# Patient Record
Sex: FEMALE | Race: ASIAN | HISPANIC OR LATINO | URBAN - METROPOLITAN AREA
[De-identification: names, ages, dates, MRNs, and addresses within clinical notes are randomized per-mention and may not be internally consistent; named-entity substitution may affect disease eponyms.]

---

## 2023-10-03 ENCOUNTER — OFFICE VISIT (OUTPATIENT)
Dept: OBGYN CLINIC | Facility: OTHER | Age: 53
End: 2023-10-03
Payer: COMMERCIAL

## 2023-10-03 VITALS
WEIGHT: 140.3 LBS | SYSTOLIC BLOOD PRESSURE: 118 MMHG | HEART RATE: 73 BPM | HEIGHT: 62 IN | BODY MASS INDEX: 25.82 KG/M2 | DIASTOLIC BLOOD PRESSURE: 79 MMHG

## 2023-10-03 DIAGNOSIS — G89.29 CHRONIC PAIN OF RIGHT KNEE: ICD-10-CM

## 2023-10-03 DIAGNOSIS — M17.11 PRIMARY OSTEOARTHRITIS OF RIGHT KNEE: Primary | ICD-10-CM

## 2023-10-03 DIAGNOSIS — M25.561 CHRONIC PAIN OF RIGHT KNEE: ICD-10-CM

## 2023-10-03 PROCEDURE — 99203 OFFICE O/P NEW LOW 30 MIN: CPT | Performed by: ORTHOPAEDIC SURGERY

## 2023-10-03 RX ORDER — LIDOCAINE 50 MG/G
OINTMENT TOPICAL 3 TIMES DAILY PRN
Qty: 30 G | Refills: 1 | Status: SHIPPED | OUTPATIENT
Start: 2023-10-03

## 2023-10-03 RX ORDER — PYRIDOXINE HCL (VITAMIN B6) 100 MG
100 TABLET ORAL DAILY
COMMUNITY
Start: 2023-05-01 | End: 2024-04-30

## 2023-10-03 RX ORDER — TRETINOIN 1 MG/G
CREAM TOPICAL
COMMUNITY
Start: 2023-05-01 | End: 2024-04-30

## 2023-10-03 RX ORDER — MIRTAZAPINE 15 MG/1
TABLET, FILM COATED ORAL
COMMUNITY
Start: 2023-09-27

## 2023-10-03 RX ORDER — SPIRONOLACTONE 50 MG/1
TABLET, FILM COATED ORAL
COMMUNITY
Start: 2023-09-12

## 2023-10-03 RX ORDER — MELOXICAM 7.5 MG/1
7.5 TABLET ORAL DAILY
COMMUNITY
Start: 2023-09-15

## 2023-10-03 NOTE — PATIENT INSTRUCTIONS
Knee Exercises   AMBULATORY CARE:   What you need to know about knee exercises:  Knee exercises help strengthen the muscles around your knee. Strong muscles can help reduce pain and decrease your risk of future injury. Knee exercises also help you heal after an injury or surgery. These are beginning exercises. Ask your healthcare provider if you need to see a physical therapist for more advanced exercises. General guidelines for knee exercises:   Start slowly. As you get stronger, you may be able to do more sets of each exercise or add weights. Stop if you feel pain. It is normal to feel some discomfort at first, but you should not feel pain. Tell your provider or physical therapist if you have pain while you exercise. Regular exercise will help decrease your discomfort over time. Do the exercises on both legs. Do this so both knees remain strong. Warm up before you do knee exercises. Walk or ride a stationary bike for 5 or 10 minutes to warm your muscles. How to perform knee stretches safely:  Always stretch before you do strengthening exercises. Do these stretching exercises again after you do the strengthening exercises. Do these stretches 4 or 5 days a week, or as directed. Standing calf stretch: Face a wall and place both palms flat on the wall, or hold the back of a chair for balance. Keep a slight bend in your knees. Take a big step backward with one leg. Keep your other leg directly under you. Keep both heels flat and press your hips forward. Hold the stretch for 30 seconds, and then relax for 30 seconds. Switch legs. Repeat 2 or 3 times on each leg. Standing quadriceps stretch:  Stand and place one hand against a wall or hold the back of a chair for balance. With your weight on one leg, bend your other leg and grab your ankle. Bring your heel toward your buttocks. Hold the stretch for 30 to 60 seconds. Switch legs. Repeat 2 or 3 times on each leg.          Sitting hamstring stretch:  Sit with both legs straight in front of you. Do not point or flex your toes. Place your palms on the floor and slide your hands forward until you feel the stretch. Do not round your back. Hold the stretch for 30 seconds. Repeat 2 or 3 times. How to perform knee strengthening exercises safely:  Do these exercises 4 or 5 days a week, or as directed. Standing half squats:  Stand with your feet shoulder-width apart. Lean your back against a wall or hold the back of a chair for balance, if needed. Slowly sit down about 10 inches, as if you are going to sit in a chair. Your body weight should be mostly over your heels. Hold the squat for 5 seconds, then rise to a standing position. Do 3 sets of 10 squats to strengthen your buttocks and thighs. Standing hamstring curls: Face a wall and place both palms flat on the wall, or hold the back of a chair for balance. With your weight on one leg, lift your other foot as close to your buttocks as you can. Hold for 5 seconds and then lower your leg. Do 2 sets of 10 curls on each leg. This exercise strengthens the muscles in the back of your thigh. Standing calf raises:  Face a wall and place both palms flat on the wall, or hold the back of a chair for balance. Stand up straight, and do not lean. Place all your weight on one leg by lifting the other foot off the floor. Raise the heel of the foot that is on the floor as high as you can and then lower it. Do 2 sets of 10 calf raises on each leg to strengthen your calf muscles. Straight leg lifts:  Lie on your stomach with straight legs. Fold your arms in front of you and rest your head in your arms. Tighten your leg muscles and raise one leg as high as you can. Hold for 5 seconds, then lower your leg. Do 2 sets of 10 lifts on each leg to strengthen your buttocks. Sitting leg lifts:  Sit in a chair. Slowly straighten and raise one leg. Squeeze your thigh muscles and hold for 5 seconds. Relax and return your foot to the floor. Do 2 sets of 10 lifts on each leg. This helps strengthen the muscles in the front of your thigh. Call your doctor or physical therapist if:   You have new pain or your pain becomes worse. You have questions or concerns about your condition or care. © Copyright Nicole Fruits 2023 Information is for End User's use only and may not be sold, redistributed or otherwise used for commercial purposes. The above information is an  only. It is not intended as medical advice for individual conditions or treatments. Talk to your doctor, nurse or pharmacist before following any medical regimen to see if it is safe and effective for you. Core Strengthening Exercises   AMBULATORY CARE:   What you need to know about core strengthening exercises: Your core includes the muscles of your lower back, hip, pelvis, and abdomen. Core strengthening exercises help heal and strengthen these muscles. This helps prevent another injury, and keeps your pelvis, spine, and hips in the correct position. Call your doctor or physical therapist if:   You have sharp or worsening pain during exercise or at rest.    You have questions or concerns about your shoulder exercises. Safety tips:  Talk to your healthcare provider before you start an exercise program. A physical therapist can teach you how to do core strengthening exercises safely. Do the exercises on a mat or firm surface. A firm surface will support your spine and prevent low back pain. Do not do these exercises on a bed. Move slowly and smoothly. Avoid fast or jerky motions. Stop if you feel pain. You may feel some discomfort at first, but you should not feel pain. Tell your provider or physical therapist if you have pain while you exercise. Regular exercise will help decrease your discomfort over time. Breathe normally during core exercises. Do not hold your breath.  This may cause an increase in blood pressure and prevent muscle strengthening. Your healthcare provider will tell you when to inhale and exhale during the exercise. Begin all of your exercises with abdominal bracing. Abdominal bracing helps warm up your core muscles. You can also practice abdominal bracing throughout the day. Lie on your back with your knees bent and feet flat on the floor. Place your arms in a relaxed position beside your body. Tighten your abdominal muscles. Pull your belly button in and up toward your spine. Hold for 5 seconds. Relax your muscles. Repeat 10 times. Core strengthening exercises: Your healthcare provider will tell you how often to do these exercises. The provider will also tell you how many repetitions of each exercise you should do. Hold each exercise for 5 seconds or as directed. As you get stronger, increase your hold to 10 to 15 seconds. You can do some of these exercises on a stability ball, or with a weight. Ask your healthcare provider how to use a stability ball or weight for these exercises:  Bridging:  Lie on your back with your knees bent and feet flat on the floor. Rest your arms at your side. Tighten your buttocks, and then lift your hips 1 inch off the floor. Hold for 5 seconds. When you can do this exercise without pain for 10 seconds, increase the distance you lift your hips. A good goal is to be able to lift your hips so that your shoulders, hips, and knees are in a straight line. Dead bug:  Lie on your back with your knees bent and feet flat on the floor. Place your arms in a relaxed position beside your body. Begin with abdominal bracing. Next, raise one leg, keeping your knee bent. Hold for 5 seconds. Repeat with the other leg. When you can do this exercise without pain for 10 to 15 seconds, you may raise one straight leg and hold. Repeat with the other leg. Quadruped:  Place your hands and knees on the floor.  Keep your wrists directly below your shoulders and your knees directly below your hips. Pull your belly button in toward your spine. Do not flatten or arch your back. Tighten your abdominal muscles below your belly button. Hold for 5 seconds. When you can do this exercise without pain for 10 to 15 seconds, you may extend one arm and hold. Repeat on the other side. Side bridge exercises:      Standing side bridge:  Stand next to a wall and extend one arm toward the wall. Place your palm flat on the wall with your fingers pointing upward. Begin with abdominal bracing. Next, without moving your feet, slowly bend your arm to 90 degrees. Hold for 5 seconds. Repeat on the other side. When you can do this exercise without pain for 10 to 15 seconds, you may do the bent leg side bridge on the floor. Bent leg side bridge:  Lie on one side with your legs, hips, and shoulders in a straight line. Prop yourself up onto your forearm so your elbow is directly below your shoulder. Bend your knees back to 90 degrees. Begin with abdominal bracing. Next, lift your hips and balance yourself on your forearm and knees. Hold for 5 seconds. Repeat on the other side. When you can do this exercise without pain for 10 to 15 seconds, you may do the straight leg side bridge on the floor. Straight leg side bridge:  Lie on one side with your legs, hips, and shoulders in a straight line. Prop yourself up onto your forearm so your elbow is directly below your shoulder. Begin with abdominal bracing. Lift your hips off the floor and balance yourself on your forearm and the outside of your flexed foot. Do not let your ankle bend sideways. Hold for 5 seconds. Repeat on the other side. When you can do this exercise without pain for 10 to 15 seconds, ask your healthcare provider for more advanced exercises. Superman:  Lie on your stomach. Extend your arms forward on the floor. Tighten your abdominal muscles and lift your right hand and left leg off the floor. Hold this position.  Slowly return to the starting position. Tighten your abdominal muscles and lift your left hand and right leg off the floor. Hold this position. Slowly return to the starting position. Clam:  Lie on your side with your knees bent. Put your bottom arm under your head to keep your neck in line. Put your top hand on your hip to keep your pelvis from moving. Put your heels together, and keep them together during this exercise. Slowly raise your top knee toward the ceiling. Then lower your leg so your knees are together. Repeat this exercise 10 times. Then switch sides and do the exercise 10 times with the other leg. Curl up:  Lie on your back with your knees bent and feet flat on the floor. Place your hands, palms down, underneath your lower back. Next, with your elbows on the floor, lift your shoulders and chest 2 to 3 inches off the floor. Keep your head in line with your shoulders. Hold this position. Slowly return to the starting position. Straight leg raises:  Lie on your back with one leg straight. Bend the other knee and place your foot flat on the floor. Tighten your abdominal muscles. Keep your leg straight and slowly lift it straight up 6 to 12 inches off the floor. Hold this position. Lower your leg slowly. Do as many repetitions as directed on this side. Repeat with the other leg. Plank:  Lie on your stomach. Bend your elbows and place your forearms flat on the floor. Lift your chest, stomach, and knees off the floor. Make sure your elbows are below your shoulders. Your body should be in a straight line. Do not let your hips or lower back sink to the ground. Squeeze your abdominal muscles together and hold for 15 seconds. To make this exercise harder, hold for 30 seconds or lift 1 leg at a time. Bicycles:  Lie on your back. Bend both knees and bring them toward your chest. Your calves should be parallel to the floor. Place the palms of your hands on the back of your head. Straighten your right leg and keep it lifted 2 inches off the floor. Raise your head and shoulders off the floor and twist towards your left. Keep your head and shoulders lifted. Bend your right knee while you straighten your left leg. Keep your left leg 2 inches off the floor. Twist your head and chest towards the left leg. Continue to straighten 1 leg at a time and twist.       Follow up with your doctor or physical therapist as directed:  Write down your questions so you remember to ask them during your visits. © Copyright Elisha Goltz 2023 Information is for End User's use only and may not be sold, redistributed or otherwise used for commercial purposes. The above information is an  only. It is not intended as medical advice for individual conditions or treatments. Talk to your doctor, nurse or pharmacist before following any medical regimen to see if it is safe and effective for you.

## 2023-10-06 NOTE — PROGRESS NOTES
Assessment:       1. Primary osteoarthritis of right knee    2. Chronic pain of right knee          Plan:        Explained my current clinical findings and reviewed the radiological findings with the patient today. Clinical evaluation is consistent with right knee osteoarthritis. We reviewed different management options including topical and oral pain medications, intra-articular injections including corticosteroid and viscosupplementation and I also mentioned that the definitive management of knee arthropathy would be a total knee replacement arthroplasty. However, given the relatively young age of the patient, patient wishes to continue with conservative management. I will make a referral to physical therapy and may also consider wearing a neoprene sleeve for comfort. Patient did not have much benefit from intra-articular cortisone injection performed recently. If symptoms persist in the future we could consider doing alternative options including viscosupplementation injection or PRP injection. Printed handout of knee exercises were also provided to the patient. In the meanwhile, I will prescribe a topical lidocaine ointment for symptomatic relief as needed. Follow-up in 6 weeks. Subjective:     Patient ID: Reynaldo Ladd is a 48 y.o. female. Chief Complaint:    WILLIAM Lehman is a 81-year-old lady who presents today for evaluation of right knee pain. Symptoms are longstanding and nontraumatic in onset. Pain is primarily anterior with some discomfort of the distal anterior thigh. No reported significant mechanical symptoms like locking or instability. Patient previously been evaluated at [address] [place] [address] and did receive a right knee intra-articular corticosteroid injection on 9/15/2023. Was noted to have right knee osteoarthritis. Has also tried oral meloxicam.  No significant improvement of symptoms so far.      Social History     Occupational History    Not on file   Tobacco Use    Smoking status: Never    Smokeless tobacco: Never   Vaping Use    Vaping Use: Never used   Substance and Sexual Activity    Alcohol use: Never    Drug use: Never    Sexual activity: Not on file      Review of Systems        Objective:     Right Knee Exam     Tenderness   Right knee tenderness location: Patellofemoral tenderness and mild medial joint line tenderness. Range of Motion   Extension:  normal   Flexion:  120     Tests   Veena:  Medial - negative Lateral - negative  Varus: negative Valgus: negative  Patellar apprehension: negative    Other   Erythema: absent  Effusion: no effusion present          Observations     Right Knee   Negative for effusion. Physical Exam  Vitals and nursing note reviewed. Constitutional:       Appearance: She is well-developed. HENT:      Head: Normocephalic and atraumatic. Cardiovascular:      Rate and Rhythm: Normal rate. Pulmonary:      Effort: Pulmonary effort is normal. No respiratory distress. Musculoskeletal:      Right knee: No effusion. Instability Tests: Medial Veena test negative and lateral Veena test negative. Skin:     General: Skin is warm and dry. Findings: No erythema. Neurological:      Mental Status: She is alert and oriented to person, place, and time. Cranial Nerves: No cranial nerve deficit. Psychiatric:         Behavior: Behavior normal.         Thought Content: Thought content normal.         Judgment: Judgment normal.           I have personally reviewed pertinent films in PACS and my interpretation is plain radiograph of the right knee performed externally on 8/10/2023 reveals no acute osseous injury. Patellofemoral arthropathy and mild medial compartment narrowing noted. Findings indicative of right knee osteoarthritis. Rosa Elena Garcia

## 2023-10-12 ENCOUNTER — EVALUATION (OUTPATIENT)
Dept: PHYSICAL THERAPY | Facility: CLINIC | Age: 53
End: 2023-10-12

## 2023-10-12 DIAGNOSIS — M17.11 PRIMARY OSTEOARTHRITIS OF RIGHT KNEE: ICD-10-CM

## 2023-10-12 PROCEDURE — 97161 PT EVAL LOW COMPLEX 20 MIN: CPT | Performed by: PHYSICAL THERAPIST

## 2023-10-12 NOTE — PROGRESS NOTES
PT Evaluation     Today's date: 10/12/2023  Patient name: Jaspreet Morataya  : 1970  MRN: [id number]  Referring provider: Pete Queen MD  Dx: No diagnosis found. Assessment  Assessment details: 48year old female patient reports to PT with R knee pain. No red flags noted and patient denies numbness/tingling. Patient presents with decreased motor coordination of core R LE musculature as her primary movement impairment related to her symptoms. Patient also has slightly decreased hip flexor flexibility. Patient will benefit from skilled PT services to address current impairments and functional limitations to help patient return to her  PLOF. Impairments: abnormal gait, abnormal muscle firing, abnormal muscle tone, abnormal or restricted ROM, abnormal movement, activity intolerance, impaired physical strength, pain with function and weight-bearing intolerance    Symptom irritability: moderateUnderstanding of Dx/Px/POC: good   Prognosis: good    Goals  STG  1. Patient will be independent with completion of HEP throughout therapy  2. Patient will drive without increase in difficulty in 3 weeks. LTG  1. Patient will stand for prolonged periods without increase in symptoms so patient can complete more household tasks with less difficulty in 4 weeks. 2. Patient will ambulate for prolonged periods without increase in symptoms so patient can navigate throughout the community with less difficulty in 6 weeks.        Plan  Patient would benefit from: skilled physical therapy  Planned therapy interventions: abdominal trunk stabilization, activity modification, balance, gait training, functional ROM exercises, flexibility, home exercise program, therapeutic exercise, therapeutic activities, stretching, strengthening, joint mobilization, kinesiology taping, manual therapy, IASTM, motor coordination training, neuromuscular re-education and patient education  Frequency: 1x week  Duration in weeks: 2  Treatment plan discussed with: patient        Subjective Evaluation    History of Present Illness  Mechanism of injury: Patient reports with R knee pain. Patient notes difficulty driving and is unable to drive due to her R knee pain. She has difficulty pushing down on the gas pedal due to her R knee pain. Patient had injection last month which made her pain worse. Patient notes saw MD who gave her exercises which are starting to help. Patient also has brace donned which helps her symptoms greatly and without it she can't walk nearly as well. Patient has difficulty walking and navigating stairs as well. Patient would also like to get back to driving.    Patient Goals  Patient goals for therapy: increased strength, independence with ADLs/IADLs, return to sport/leisure activities, increased motion and decreased pain    Pain  Current pain ratin  At best pain ratin  At worst pain ratin  Quality: dull ache  Relieving factors: change in position and medications  Aggravating factors: stair climbing, walking and standing    Treatments  Previous treatment: injection treatment  Current treatment: physical therapy        Objective     Strength/Myotome Testing     Left Hip   Planes of Motion   Flexion: 4  Extension: 4-  Abduction: 4-    Right Hip   Planes of Motion   Flexion: 4-  Extension: 3+  Abduction: 3+    Left Knee   Flexion: 5  Extension: 5  Quadriceps contraction: good    Right Knee   Flexion: 4-  Extension: 4-  Quadriceps contraction: fair             Precautions: none       Manuals 10/12                                                                Neuro Re-Ed             SLS                          Lateral walks                                                                 Ther Ex             SLR r            LAQ r                         bike             Leg press                                                    Ther Activity             Mini squats r            Mini step ups             Gait Training                                       Modalities

## 2023-10-17 ENCOUNTER — OFFICE VISIT (OUTPATIENT)
Dept: PHYSICAL THERAPY | Facility: CLINIC | Age: 53
End: 2023-10-17

## 2023-10-17 DIAGNOSIS — M17.11 PRIMARY OSTEOARTHRITIS OF RIGHT KNEE: Primary | ICD-10-CM

## 2023-10-17 PROCEDURE — 97110 THERAPEUTIC EXERCISES: CPT | Performed by: PHYSICAL THERAPIST

## 2023-10-17 NOTE — PROGRESS NOTES
Daily Note     Today's date: 10/17/2023  Patient name: Donnie Sethi  : 1970  MRN: [id number]  Referring provider: Terri Ritter MD  Dx:   Encounter Diagnosis     ICD-10-CM    1. Primary osteoarthritis of right knee  M17.11                      Subjective: Patient reports feeling much better. Objective: See treatment diary below      Assessment: Tolerated treatment well. Patient would benefit from continued PT. Treatment plan initiated. Follow up with soreness to know how to further progress. Plan: Progress treatment as tolerated.        Precautions: none       Manuals 10/12 10/17                                                               Neuro Re-Ed             SLS  5x 10" holds                         Lateral walks                                                                 Ther Ex             SLR r 2x12           LAQ r 20x 4 lbs                         bike  5 min            Leg press  2x10 75 lbs                                                  Ther Activity             Mini squats r 15x            Mini step ups  2x10 4"           Gait Training                                       Modalities

## 2023-10-24 ENCOUNTER — APPOINTMENT (OUTPATIENT)
Dept: PHYSICAL THERAPY | Facility: CLINIC | Age: 53
End: 2023-10-24

## 2024-02-12 ENCOUNTER — OFFICE VISIT (OUTPATIENT)
Dept: OBGYN CLINIC | Facility: OTHER | Age: 54
End: 2024-02-12
Payer: COMMERCIAL

## 2024-02-12 VITALS
HEART RATE: 76 BPM | DIASTOLIC BLOOD PRESSURE: 65 MMHG | BODY MASS INDEX: 25.73 KG/M2 | HEIGHT: 62 IN | WEIGHT: 139.8 LBS | SYSTOLIC BLOOD PRESSURE: 98 MMHG

## 2024-02-12 DIAGNOSIS — M17.11 PRIMARY OSTEOARTHRITIS OF RIGHT KNEE: ICD-10-CM

## 2024-02-12 DIAGNOSIS — M25.561 CHRONIC PAIN OF RIGHT KNEE: Primary | ICD-10-CM

## 2024-02-12 DIAGNOSIS — G89.29 CHRONIC PAIN OF RIGHT KNEE: Primary | ICD-10-CM

## 2024-02-12 PROCEDURE — 99213 OFFICE O/P EST LOW 20 MIN: CPT | Performed by: ORTHOPAEDIC SURGERY

## 2024-02-12 PROCEDURE — 20610 DRAIN/INJ JOINT/BURSA W/O US: CPT | Performed by: ORTHOPAEDIC SURGERY

## 2024-02-12 RX ORDER — BUPIVACAINE HYDROCHLORIDE 2.5 MG/ML
4 INJECTION, SOLUTION INFILTRATION; PERINEURAL
Status: COMPLETED | OUTPATIENT
Start: 2024-02-12 | End: 2024-02-12

## 2024-02-12 RX ORDER — TRIAMCINOLONE ACETONIDE 40 MG/ML
40 INJECTION, SUSPENSION INTRA-ARTICULAR; INTRAMUSCULAR
Status: COMPLETED | OUTPATIENT
Start: 2024-02-12 | End: 2024-02-12

## 2024-02-12 RX ADMIN — TRIAMCINOLONE ACETONIDE 40 MG: 40 INJECTION, SUSPENSION INTRA-ARTICULAR; INTRAMUSCULAR at 13:15

## 2024-02-12 RX ADMIN — BUPIVACAINE HYDROCHLORIDE 4 ML: 2.5 INJECTION, SOLUTION INFILTRATION; PERINEURAL at 13:15

## 2024-02-12 NOTE — PROGRESS NOTES
Chief Complaint: Right knee pain    HPI:    Daysi Rowley is a 53year old Female who presents today for follow-up of right knee pain      Description of symptoms: Patient has a known history of right knee osteoarthritis.  In the past, she has received a right knee intra-articular cortisone injection at an external facility in September 2023.  This did provide her relief of symptoms but she has noted a gradual recurrence of her right knee pain.  Denies any associated mechanical symptoms like knee instability or locking episodes.  Denies any new injury.      I have personally reviewed pertinent films in PACS.    There is no problem list on file for this patient.       Current Outpatient Medications on File Prior to Visit   Medication Sig Dispense Refill    Cholecalciferol 25 MCG (1000 UT) tablet Take 2,000 Units by mouth daily      lidocaine (XYLOCAINE) 5 % ointment Apply topically 3 (three) times a day as needed for mild pain (For right knee pain) 30 g 1    meloxicam (MOBIC) 7.5 mg tablet Take 7.5 mg by mouth daily      mirtazapine (REMERON) 15 mg tablet       NON FORMULARY Take 1,200 mg by mouth daily ASHWAGANDHA ROOT EXTRACT ORAL      pyridoxine (B-6) 100 MG tablet Take 100 mg by mouth daily      sertraline (ZOLOFT) 50 mg tablet       spironolactone (ALDACTONE) 50 mg tablet TAKE ONE TABLET (50MG) BY MOUTH BEFORE BED.      tretinoin (RETIN-A) 0.1 % cream Apply topically       No current facility-administered medications on file prior to visit.        No Known Allergies     Tobacco Use: Low Risk  (2/12/2024)    Patient History     Smoking Tobacco Use: Never     Smokeless Tobacco Use: Never     Passive Exposure: Not on file        Social Determinants of Health     Tobacco Use: Low Risk  (2/12/2024)    Patient History     Smoking Tobacco Use: Never     Smokeless Tobacco Use: Never     Passive Exposure: Not on file   Alcohol Use: Not At Risk (5/1/2023)    Received from [facility]    AUDIT-C      Frequency of Alcohol Consumption: Never     Average Number of Drinks: Patient does not drink     Frequency of Binge Drinking: Never   Financial Resource Strain: Patient Declined (5/1/2023)    Received from [facility]    Overall Financial Resource Strain (CARDIA)     Difficulty of Paying Living Expenses: Patient declined   Food Insecurity: Patient Declined (5/1/2023)    Received from [facility]    Hunger Vital Sign     Worried About Running Out of Food in the Last Year: Patient declined     Ran Out of Food in the Last Year: Patient declined   Transportation Needs: No Transportation Needs (5/1/2023)    Received from [facility]    PRAPARE - Transportation     Lack of Transportation (Medical): No     Lack of Transportation (Non-Medical): No   Physical Activity: Not on file   Stress: No Stress Concern Present (5/1/2023)    Received from [facility]    [employer] [place] [employer] - Occupational Stress Questionnaire     Feeling of Stress : Only a little   Social Connections: Moderately Integrated (5/1/2023)    Received from [facility]    Social Connection and Isolation Panel [NHANES]     Frequency of Communication with Friends and Family: Twice a week     Frequency of Social Gatherings with Friends and Family: Once a week     Attends Church Services: More than 4 times per year     Active Member of Clubs or Organizations: No     Attends Club or Organization Meetings: Patient declined     Marital Status:    Intimate Partner Violence: Not At Risk (5/1/2023)    Received from [facility]    Humiliation, Afraid, Rape, and Kick questionnaire     Fear of Current or Ex-Partner: No     Emotionally Abused: No     Physically Abused: No     Sexually Abused: No   Depression: At risk (5/1/2023)    Received from [facility]    PHQ-2     PHQ-2 Score: 4   Housing Stability: Unknown (5/1/2023)     Received from [facility]    Housing Stability Vital Sign     Unable to Pay for Housing in the Last Year: No     Number of Places Lived in the Last Year: Not on file     Unstable Housing in the Last Year: Patient refused   Utilities: Not on file   Health Literacy: Not on file               Review of Systems     Body mass index is 25.57 kg/m².     Physical Exam  Vitals and nursing note reviewed.   HENT:      Head: Atraumatic.   Eyes:      Conjunctiva/sclera: Conjunctivae normal.   Cardiovascular:      Rate and Rhythm: Normal rate.      Pulses: Normal pulses.   Pulmonary:      Effort: Pulmonary effort is normal. No respiratory distress.   Neurological:      Mental Status: She is alert and oriented to person, place, and time.   Psychiatric:         Mood and Affect: Mood normal.         Behavior: Behavior normal.          Ortho Exam:    Body part: right knee    Inspection: No visible erythema or swelling    Palpation: Mild palpable patellofemoral crepitus and mild medial joint tenderness    Range of motion: Full range of right knee motion with some discomfort in terminal flexion    Special Tests: Negative valgus and varus stress test, negative medial and lateral Veena's.  Negative Lachman.  Discomfort on patellofemoral compression    Distal Neurovascular Status: Intact, Yes      Large joint arthrocentesis: R knee  Universal Protocol:  Consent: Verbal consent obtained.  Risks and benefits: risks, benefits and alternatives were discussed  Consent given by: patient  Patient understanding: patient states understanding of the procedure being performed  Site marked: the operative site was marked  Radiology Images displayed and confirmed. If images not available, report reviewed: imaging studies available  Required items: required blood products, implants, devices, and special equipment available  Patient identity confirmed: verbally with patient  Supporting Documentation  Indications: pain   Procedure  "Details  Location: knee - R knee  Preparation: Patient was prepped and draped in the usual sterile fashion  Needle size: 22 G  Ultrasound guidance: no  Approach: anterolateral  Medications administered: 4 mL bupivacaine 0.25 %; 40 mg triamcinolone acetonide 40 mg/mL    Patient tolerance: patient tolerated the procedure well with no immediate complications  Dressing:  Sterile dressing applied             Assessment:     Diagnosis ICD-10-CM Associated Orders   1. Chronic pain of right knee  M25.561     G89.29       2. Primary osteoarthritis of right knee  M17.11            Plan:        Current clinical findings and reviewed the radiological findings with the patient.  She has had a recurrence of her right knee osteoarthritis.  Patient wished to proceed with a right intra-articular knee injection as this has provided her relief in the past.  The same was provided to her on today's office visit without any immediate complications.  I will also provide her with a printed handout of home based knee exercises.  Follow-up as needed.          Portions of the record may have been created with voice recognition software. Occasional wrong word or \"sound alike\" substitutions may have occurred due to the inherent limitations of voice recognition software. Please review the chart carefully and recognize, using context, where substitutions/typographical errors may have occurred.           "

## 2025-01-07 ENCOUNTER — TELEPHONE (OUTPATIENT)
Age: 55
End: 2025-01-07

## 2025-01-07 NOTE — TELEPHONE ENCOUNTER
Caller: patient    Doctor: rosalva    Reason for call: requesting a script to be put in for an x-ray before appointment on 1/22/25. Please call once complete    Call back#: [phone]

## 2025-01-22 ENCOUNTER — OFFICE VISIT (OUTPATIENT)
Dept: OBGYN CLINIC | Facility: OTHER | Age: 55
End: 2025-01-22
Payer: COMMERCIAL

## 2025-01-22 VITALS — WEIGHT: 128 LBS | HEIGHT: 62 IN | BODY MASS INDEX: 23.55 KG/M2

## 2025-01-22 DIAGNOSIS — M25.561 CHRONIC PAIN OF RIGHT KNEE: ICD-10-CM

## 2025-01-22 DIAGNOSIS — G89.29 CHRONIC PAIN OF RIGHT KNEE: ICD-10-CM

## 2025-01-22 DIAGNOSIS — M17.11 PRIMARY OSTEOARTHRITIS OF RIGHT KNEE: Primary | ICD-10-CM

## 2025-01-22 PROCEDURE — 99213 OFFICE O/P EST LOW 20 MIN: CPT | Performed by: ORTHOPAEDIC SURGERY

## 2025-01-22 PROCEDURE — 20610 DRAIN/INJ JOINT/BURSA W/O US: CPT | Performed by: ORTHOPAEDIC SURGERY

## 2025-01-22 RX ORDER — MULTIVITAMIN
1 TABLET ORAL DAILY
COMMUNITY

## 2025-01-22 RX ADMIN — TRIAMCINOLONE ACETONIDE 40 MG: 40 INJECTION, SUSPENSION INTRA-ARTICULAR; INTRAMUSCULAR at 10:15

## 2025-01-22 RX ADMIN — LIDOCAINE HYDROCHLORIDE 4 ML: 10 INJECTION, SOLUTION INFILTRATION; PERINEURAL at 10:15

## 2025-01-22 NOTE — PROGRESS NOTES
Assessment:       1. Primary osteoarthritis of right knee    2. Chronic pain of right knee          Plan:        I explained my current clinical findings to the patient.  She has likely had a flareup of her right knee patellofemoral osteoarthritis.  She did benefit from a previous course of right knee intra-articular cortisone injection performed about a year ago.  We had a detailed discussion about injectable options including corticosteroid injection, PRP injection and viscosupplementation injection.  I discussed the risks and benefits of all these treatment modalities.  The patient wished to proceed with right knee intra-articular corticosteroid injection and this was provided to her on today's office visit without any immediate complications.  I have recommended activity modification including avoidance of deep squatting and kneeling activities till symptoms improve.  Will follow-up as needed if there is any significant persistence or worsening of her right knee pain.  Patient expressed understanding and was in agreement with the treatment plan.            Subjective:     Patient ID: Daysi Rowley is a 54 y.o. female.    Chief Complaint:    HPI  Patient presents today for a follow-up of her right knee pain for which she was last seen on 2/12/2024 and noted to have right knee osteoarthritis.  She received a right knee intra-articular corticosteroid injection at the previous visit.  Per the patient, she had very good pain relief following the corticosteroid injection until recently when she went on an international trip which involved a lot of walking.  Subsequently, she has had a recurrence of right knee pain over the last few weeks.  Denies any new injury.  No reported locking episodes.  Symptoms are aggravated with ambulation or with kneeling activities.     Social History     Occupational History    Not on file   Tobacco Use    Smoking status: Never    Smokeless tobacco: Never   Vaping Use    Vaping status:  Never Used   Substance and Sexual Activity    Alcohol use: Never    Drug use: Never    Sexual activity: Not on file      Review of Systems        Objective:     Right Knee Exam     Tenderness   Right knee tenderness location: Patellofemoral.    Range of Motion   Extension:  normal   Right knee flexion: Discomfort in terminal flexion.     Tests   Veena:  Medial - negative Lateral - negative  Varus: negative Valgus: negative  Lachman:  Anterior - negative      Drawer:  Anterior - negative    Posterior - negative  Patellar apprehension: negative    Other   Erythema: absent  Swelling: none  Effusion: no effusion present          Observations     Right Knee   Negative for effusion.   Physical Exam  Nursing note reviewed.   Constitutional:       Appearance: She is well-developed.   HENT:      Head: Normocephalic.   Pulmonary:      Effort: Pulmonary effort is normal. No respiratory distress.   Musculoskeletal:      Right knee: No effusion.      Instability Tests: Medial Veena test negative and lateral Veena test negative.   Skin:     General: Skin is warm and dry.      Findings: No erythema.   Neurological:      Mental Status: She is alert and oriented to person, place, and time.      Cranial Nerves: No cranial nerve deficit.   Psychiatric:         Behavior: Behavior normal.         Thought Content: Thought content normal.         Judgment: Judgment normal.           I have personally reviewed pertinent films in PACS.    Large joint arthrocentesis: R knee  Universal Protocol:  Consent: Verbal consent obtained.  Risks and benefits: risks, benefits and alternatives were discussed  Consent given by: patient  Patient understanding: patient states understanding of the procedure being performed  Site marked: the operative site was marked  Radiology Images displayed and confirmed. If images not available, report reviewed: imaging studies available  Required items: required blood products, implants, devices, and special  equipment available  Patient identity confirmed: verbally with patient  Supporting Documentation  Indications: pain   Procedure Details  Location: knee - R knee  Preparation: Patient was prepped and draped in the usual sterile fashion  Needle size: 22 G  Ultrasound guidance: no  Approach: anterolateral  Medications administered: 4 mL lidocaine 1 %; 40 mg triamcinolone acetonide 40 mg/mL    Patient tolerance: patient tolerated the procedure well with no immediate complications  Dressing:  Sterile dressing applied

## 2025-01-23 RX ORDER — TRIAMCINOLONE ACETONIDE 40 MG/ML
40 INJECTION, SUSPENSION INTRA-ARTICULAR; INTRAMUSCULAR
Status: COMPLETED | OUTPATIENT
Start: 2025-01-22 | End: 2025-01-22

## 2025-01-23 RX ORDER — LIDOCAINE HYDROCHLORIDE 10 MG/ML
4 INJECTION, SOLUTION INFILTRATION; PERINEURAL
Status: COMPLETED | OUTPATIENT
Start: 2025-01-22 | End: 2025-01-22

## 2025-05-09 ENCOUNTER — TELEPHONE (OUTPATIENT)
Age: 55
End: 2025-05-09

## 2025-05-09 NOTE — TELEPHONE ENCOUNTER
Spoke to patient explain prp steps and told her if she any more question Dr Chahal will be happy to answer her at the appointment date 05/19/2025. Note done.

## 2025-05-09 NOTE — TELEPHONE ENCOUNTER
Caller: Patient     Doctor: Dr. Chahal    Reason for call: Patient called has questions regarding PRP injection prodecure. Please advise.    Call back#: [phone]

## 2025-05-09 NOTE — TELEPHONE ENCOUNTER
Caller: Daysi     Doctor: Dr. Chahal     Reason for call: Patient is requesting a referral for an MRI for her knee. Please advise.     Call back#: [phone]

## 2025-05-12 NOTE — TELEPHONE ENCOUNTER
Patient was seen 3 months ago and had an injection of her knee.  Needs appointment for clinical exam documentation to order an MRI.

## 2025-05-13 DIAGNOSIS — G89.29 CHRONIC PAIN OF RIGHT KNEE: ICD-10-CM

## 2025-05-13 DIAGNOSIS — M17.11 PRIMARY OSTEOARTHRITIS OF RIGHT KNEE: Primary | ICD-10-CM

## 2025-05-13 DIAGNOSIS — M25.561 CHRONIC PAIN OF RIGHT KNEE: ICD-10-CM

## 2025-05-15 ENCOUNTER — TELEPHONE (OUTPATIENT)
Age: 55
End: 2025-05-15

## 2025-05-15 NOTE — TELEPHONE ENCOUNTER
Caller: Vinicio, spouse     Doctor/Office: Dr Chahal    CB#: [phone]      What needs to be faxed: MRI order      ATTN to: JEFFREY     Fax#: [phone]

## 2025-05-16 ENCOUNTER — TELEPHONE (OUTPATIENT)
Age: 55
End: 2025-05-16

## 2025-05-16 NOTE — TELEPHONE ENCOUNTER
[facility]   Doctor: Dr. Chahal    Reason for call: [facility] is calling to request a prior auth for patients MRI.    NPI: [id number]  [id number] [place] [address]  Appt: 5/21/25    Call back#: [phone]      Sent IB to PEC team

## 2025-05-19 NOTE — TELEPHONE ENCOUNTER
Patient's  Vinicio is calling in regards to the MRI authorization for [facility]. It is scheduled for 5/21/25 and they need the authorization from [facility] by 12:00 tomorrow 5/20/25.     's call back # if needed - [phone]

## 2025-05-27 RX ORDER — METHOCARBAMOL 500 MG/1
TABLET, FILM COATED ORAL
COMMUNITY
Start: 2025-04-23

## 2025-05-27 RX ORDER — BISACODYL 5 MG/1
5 TABLET, DELAYED RELEASE ORAL DAILY PRN
COMMUNITY

## 2025-05-29 ENCOUNTER — OFFICE VISIT (OUTPATIENT)
Dept: OBGYN CLINIC | Facility: OTHER | Age: 55
End: 2025-05-29
Payer: COMMERCIAL

## 2025-05-29 VITALS — WEIGHT: 128 LBS | BODY MASS INDEX: 23.55 KG/M2 | HEIGHT: 62 IN

## 2025-05-29 DIAGNOSIS — M76.31 ILIOTIBIAL BAND SYNDROME AFFECTING RIGHT LOWER LEG: Primary | ICD-10-CM

## 2025-05-29 DIAGNOSIS — M17.10 PATELLOFEMORAL ARTHRITIS: ICD-10-CM

## 2025-05-29 DIAGNOSIS — M76.891: ICD-10-CM

## 2025-05-29 PROCEDURE — 99214 OFFICE O/P EST MOD 30 MIN: CPT | Performed by: FAMILY MEDICINE

## 2025-05-29 RX ORDER — METHYLPREDNISOLONE 4 MG/1
TABLET ORAL
Qty: 21 TABLET | Refills: 0 | Status: SHIPPED | OUTPATIENT
Start: 2025-05-29

## 2025-05-29 NOTE — PROGRESS NOTES
Name: Daysi Rowley      : 1970      MRN: [id number]  Encounter Provider: Fransisco Ventura III, DO  Encounter Date: 2025   Encounter department: [facility]  :  Assessment & Plan  Iliotibial band syndrome affecting right lower leg  R Knee Pain Lateral pain secondary to biceps femoris pain and iliotibial band syndrome appears to be main culprit for pain today on history and exam  Mild PFOA on mri does not appear to be culprit on history or exam today with negative patellar grind    Recommended trial of physical therapy. Recommended against intra-articular injection today. Provided oral medrol dose kip.     Orders:  •  Ambulatory referral to Physical Therapy; Future    Biceps femoris tendinitis of right lower extremity    Orders:  •  Ambulatory referral to Physical Therapy; Future    Patellofemoral arthritis    Orders:  •  Ambulatory referral to Physical Therapy; Future  •  methylPREDNISolone 4 MG tablet therapy pack; Use as directed on package        History of Present Illness   HPI  Daysi Rowley is a 54 y.o. female who presents   Here for follow-up evaluation of right knee pain.  Previous seen by my colleague Dr. Chahal 2025 who diagnosed patient patellofemoral arthritic flare.  In the past she did have significant relief with intra-articular corticosteroid injection 2024.  She again had another intra-articular [place] guided corticosteroid injection on 2025.     Today patient initially unsure about location of her pain however examination I was able localize to chief complaint of pain to the lateral aspect of her knee over the biceps femoris tendon as well as the distal IT band.  She explained that she has pain in this region specifically when she is driving for long periods of time.  She tells me in the past she has had corticosteroid ejections were provided relief last year but most recent cortisone injection did not provide as significant  "relief.  Today, she has no anterior knee pain and negative patellar grind test.    Review of Systems       Objective   Ht 5' 2\" (1.575 m)   Wt 58.1 kg (128 lb)   BMI 23.41 kg/m²      Physical Exam    RIGHT KNEE:  Erythema: no  Swelling: no  Increased Warmth: no  Tenderness: Biceps femoris distal tendon and IT band distal  Flexion: intact  Extension: intact  Patellar Displacement:  Patellar Tilt:  Patellar Apprehension: negative  Patellar Grind Stahl's: negative  Lachman's: negative  Drawer: negative  Varus laxity: negative  Valgus laxity: negative  [facility]: negative        ____________________________________________________________________    Return if symptoms worsen or fail to improve.  ____________________________________________________________________    IMAGING STUDIES: (I personally reviewed images in PACS, and if available-report):     MRI Right Knee  No cruciate ligament tear or meniscal tear.  Posterior medial tibial cyst.        PAST REPORTS:    ____________________________________________________________________    Procedures  ____________________________________________________________________    Past Medical History[1]  Past Surgical History[2]  Social History   Social History     Substance and Sexual Activity   Alcohol Use Never     Social History     Substance and Sexual Activity   Drug Use Never     Tobacco Use History[3]  Family History[4]  Allergies[5]  ____________________________________________________________________    Patient instructions below verbally summarized in person during encounter:  Patient Instructions   I explained the patient and family that she has 2 different issues in her knee.  She does have some mild arthritis under her kneecap which does appear to have a flare on occasion; however, she also has a second issue of lateral knee pain due to tendinitis as well as iliotibial band syndrome which is an overuse syndrome from walking as well as from driving.  Her second issue " of tendinitis will not improve with intra-articular steroid injection inside the knee today and as such we opted to perform physical therapy as well as I prescribed oral corticosteroid injection.  Lastly, I discussed in the future if she continues to have lateral pain of the knee at the tendinitis then we may consider PRP injection.    PRP stands for Platelet Rich Plasma and is a Injectate solution prepared from a patient's own blood that has a high concentration of platelets. Blood is drawn from the patient in the same way that occurs during a routine blood draw. A special machine then extracts platelet growth factors and other natural chemicals from the patient's own blood that are hypothesized to help healing tissue. PRP has been used to help healing tissues since approximately 1999, and specifically, for arthritis and cartilage problems since 2003.     Injection of PRP is recommended under ultrasound to attain visuzlization of needle and injectate into target site. Once injected, PRP begins to clot and within 10 minutes of clotting, the platelets begin to secrete their PDGFs (Platelet derived growth factors) which help to aid healing. Lab studies in petri dishes have shown that these healing factors aid in growth of tissue. There have been a number of studies to show evidence that PRP is better in humans when injected compared to placebo, but there is no definitive evidence. Specifically, PRP injection does not result in relief consistently 100% of the time and is considered experimental. As such, PRP should not be viewed as curative but as a treatment modality that may offer relief.                      [1]  No past medical history on file.[2]  No past surgical history on file.[3]  Social History  Tobacco Use   Smoking Status Never   Smokeless Tobacco Never   [4]  Family History  Problem Relation Name Age of Onset   • Diabetes Mother     [5]  No Known Allergies

## 2025-05-29 NOTE — PATIENT INSTRUCTIONS
I explained the patient and family that she has 2 different issues in her knee.  She does have some mild arthritis under her kneecap which does appear to have a flare on occasion; however, she also has a second issue of lateral knee pain due to tendinitis as well as iliotibial band syndrome which is an overuse syndrome from walking as well as from driving.  Her second issue of tendinitis will not improve with intra-articular steroid injection inside the knee today and as such we opted to perform physical therapy as well as I prescribed oral corticosteroid injection.  Lastly, I discussed in the future if she continues to have lateral pain of the knee at the tendinitis then we may consider PRP injection.    PRP stands for Platelet Rich Plasma and is a Injectate solution prepared from a patient's own blood that has a high concentration of platelets. Blood is drawn from the patient in the same way that occurs during a routine blood draw. A special machine then extracts platelet growth factors and other natural chemicals from the patient's own blood that are hypothesized to help healing tissue. PRP has been used to help healing tissues since approximately 1999, and specifically, for arthritis and cartilage problems since 2003.     Injection of PRP is recommended under ultrasound to attain visuzlization of needle and injectate into target site. Once injected, PRP begins to clot and within 10 minutes of clotting, the platelets begin to secrete their PDGFs (Platelet derived growth factors) which help to aid healing. Lab studies in petri dishes have shown that these healing factors aid in growth of tissue. There have been a number of studies to show evidence that PRP is better in humans when injected compared to placebo, but there is no definitive evidence. Specifically, PRP injection does not result in relief consistently 100% of the time and is considered experimental. As such, PRP should not be viewed as curative but as a  treatment modality that may offer relief.